# Patient Record
Sex: MALE | Race: WHITE | NOT HISPANIC OR LATINO | URBAN - METROPOLITAN AREA
[De-identification: names, ages, dates, MRNs, and addresses within clinical notes are randomized per-mention and may not be internally consistent; named-entity substitution may affect disease eponyms.]

---

## 2018-07-02 PROBLEM — Z00.00 ENCOUNTER FOR PREVENTIVE HEALTH EXAMINATION: Status: ACTIVE | Noted: 2018-07-02

## 2018-07-31 ENCOUNTER — OUTPATIENT (OUTPATIENT)
Dept: OUTPATIENT SERVICES | Facility: HOSPITAL | Age: 23
LOS: 1 days | Discharge: HOME | End: 2018-07-31

## 2018-07-31 ENCOUNTER — APPOINTMENT (OUTPATIENT)
Dept: HEMATOLOGY ONCOLOGY | Facility: CLINIC | Age: 23
End: 2018-07-31

## 2018-07-31 ENCOUNTER — LABORATORY RESULT (OUTPATIENT)
Age: 23
End: 2018-07-31

## 2018-07-31 VITALS
TEMPERATURE: 98.2 F | WEIGHT: 130 LBS | DIASTOLIC BLOOD PRESSURE: 74 MMHG | HEART RATE: 79 BPM | RESPIRATION RATE: 14 BRPM | BODY MASS INDEX: 20.89 KG/M2 | HEIGHT: 66 IN | SYSTOLIC BLOOD PRESSURE: 98 MMHG

## 2018-07-31 DIAGNOSIS — Z86.2 PERSONAL HISTORY OF DISEASES OF THE BLOOD AND BLOOD-FORMING ORGANS AND CERTAIN DISORDERS INVOLVING THE IMMUNE MECHANISM: ICD-10-CM

## 2018-07-31 DIAGNOSIS — Z83.2 FAMILY HISTORY OF DISEASES OF THE BLOOD AND BLOOD-FORMING ORGANS AND CERTAIN DISORDERS INVOLVING THE IMMUNE MECHANISM: ICD-10-CM

## 2018-07-31 LAB
HCT VFR BLD CALC: 40.3 %
HGB BLD-MCNC: 13.9 G/DL
MCHC RBC-ENTMCNC: 30.4 PG
MCHC RBC-ENTMCNC: 34.5 G/DL
MCV RBC AUTO: 88.2 FL
PLATELET # BLD AUTO: 249 K/UL
PMV BLD: 9.9 FL
RBC # BLD: 4.57 M/UL
RBC # FLD: 11.6 %
WBC # FLD AUTO: 6.53 K/UL

## 2018-07-31 RX ORDER — EUCALYPTUS OIL/MENTHOL/CAMPHOR 1.2%-4.8%
1000 OINTMENT (GRAM) TOPICAL
Refills: 0 | Status: ACTIVE | COMMUNITY

## 2018-08-02 DIAGNOSIS — D72.829 ELEVATED WHITE BLOOD CELL COUNT, UNSPECIFIED: ICD-10-CM

## 2019-03-01 ENCOUNTER — TRANSCRIPTION ENCOUNTER (OUTPATIENT)
Age: 24
End: 2019-03-01

## 2022-01-03 ENCOUNTER — OUTPATIENT (OUTPATIENT)
Dept: OUTPATIENT SERVICES | Facility: HOSPITAL | Age: 27
LOS: 1 days | Discharge: HOME | End: 2022-01-03

## 2022-01-03 ENCOUNTER — APPOINTMENT (OUTPATIENT)
Dept: HEMATOLOGY ONCOLOGY | Facility: CLINIC | Age: 27
End: 2022-01-03
Payer: COMMERCIAL

## 2022-01-03 VITALS
HEART RATE: 80 BPM | HEIGHT: 66 IN | SYSTOLIC BLOOD PRESSURE: 127 MMHG | WEIGHT: 149 LBS | TEMPERATURE: 97.6 F | BODY MASS INDEX: 23.95 KG/M2 | DIASTOLIC BLOOD PRESSURE: 68 MMHG

## 2022-01-03 DIAGNOSIS — D72.829 ELEVATED WHITE BLOOD CELL COUNT, UNSPECIFIED: ICD-10-CM

## 2022-01-03 PROCEDURE — 99213 OFFICE O/P EST LOW 20 MIN: CPT

## 2022-01-03 NOTE — HISTORY OF PRESENT ILLNESS
[de-identified] : 23yo with PMHx of Mild Von Willebrand Disease, presents for evaluation after referral by PMD for persistently elevated WBC count. The patient had CBC done 5/25/18\par which demonstrated WBC 13.1 [place] [address], Hgb 13.8, Plt 238. Patient states he had an URTI at the time of initial bloodwork. Patient then had repeat bloodwork 6/22/18 which demonstrated WBC 12.5 and [place] [address]. Patient overall feels well, has no complaints, denies chest pain, shortness of breath, N/V/D, abdominal pain, changes in urinary habits, cough or joint pain   [de-identified] : 1/3/22- Patient is here for follow up after 3 years. We had seen him in 2018 for mild leukocytosis. Repeat CBC was normal at that time. He also has h/o mild type 1 VWD. No h/o bleeding events. He has had hernia Sx in the past with no bleeding complications. He does not endorse any new complaints. He works out in a gym and stays healthy.

## 2022-01-03 NOTE — CONSULT LETTER
[Dear  ___] : Dear  [unfilled], [Consult Letter:] : I had the pleasure of evaluating your patient, [unfilled]. [Please see my note below.] : Please see my note below. [Consult Closing:] : Thank you very much for allowing me to participate in the care of this patient.  If you have any questions, please do not hesitate to contact me. [Sincerely,] : Sincerely, [FreeTextEntry3] : Selvin Baltazar MD

## 2022-01-03 NOTE — ASSESSMENT
[FreeTextEntry1] : #21yo M with h/o elevated WBC with neutrophilia in 2018, in the setting of infection and repeat CBC was normal \par \par # h/o VWD type 1 mild- No bleeding complications. \par \par 09/ 2021 CBC is normal WBC 5.3, Hb 13.7, Plt 246, MCV 93. \par \par PLAN:\par No need for any additional work up\par RTC in 1 year\par \par Patient was seen and examined and discussed with

## 2022-01-06 PROBLEM — D72.829 INCREASED WHITE BLOOD CELL COUNT: Status: ACTIVE | Noted: 2018-07-31

## 2022-01-10 DIAGNOSIS — D72.829 ELEVATED WHITE BLOOD CELL COUNT, UNSPECIFIED: ICD-10-CM
